# Patient Record
Sex: FEMALE
[De-identification: names, ages, dates, MRNs, and addresses within clinical notes are randomized per-mention and may not be internally consistent; named-entity substitution may affect disease eponyms.]

---

## 2018-05-28 ENCOUNTER — HOSPITAL ENCOUNTER (EMERGENCY)
Dept: HOSPITAL 31 - C.ER | Age: 38
Discharge: HOME | End: 2018-05-28
Payer: MEDICAID

## 2018-05-28 VITALS
DIASTOLIC BLOOD PRESSURE: 78 MMHG | HEART RATE: 69 BPM | TEMPERATURE: 98.2 F | SYSTOLIC BLOOD PRESSURE: 120 MMHG | OXYGEN SATURATION: 100 %

## 2018-05-28 VITALS — RESPIRATION RATE: 18 BRPM

## 2018-05-28 DIAGNOSIS — S39.012A: Primary | ICD-10-CM
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
## 2018-05-28 PROCEDURE — 96372 THER/PROPH/DIAG INJ SC/IM: CPT

## 2018-05-28 PROCEDURE — 99283 EMERGENCY DEPT VISIT LOW MDM: CPT

## 2018-05-28 NOTE — C.PDOC
History Of Present Illness


37 year old female presents to the ED for evaluation of lower back pain which 

began this morning. Patient states she bent over to put her pants on, when she 

felt pain. She applied topical muscle rub cream without relief. She denies neck 

pain, recent trauma/injuries, urinary/bowel incontinence, and extremity numbness

/weakness. 


Time Seen by Provider: 05/28/18 14:25


Chief Complaint (Nursing): Back Pain


History Per: Patient


History/Exam Limitations: no limitations


Onset/Duration Of Symptoms: Hrs


Current Symptoms Are (Timing): Still Present


Quality Of Discomfort: "Pain"


Previous Symptoms: Back Pain


Associated Symptoms: denies: Incontinence, New Weakness, New Numbness


Additional History Per: Patient





Past Medical History


Reviewed: Historical Data, Nursing Documentation, Vital Signs


Vital Signs: 


 Last Vital Signs











Temp  98.2 F   05/28/18 14:16


 


Pulse  69   05/28/18 14:16


 


Resp  18   05/28/18 15:33


 


BP  120/78   05/28/18 14:16


 


Pulse Ox  100   05/28/18 15:30














- Medical History


PMH: No Chronic Diseases


Surgical History: No Surg Hx


Family History: States: Unknown Family Hx





- Social History


Hx Alcohol Use: Yes


Hx Substance Use: No





- Immunization History


Hx Tetanus Toxoid Vaccination: No


Hx Influenza Vaccination: No


Hx Pneumococcal Vaccination: No





Review Of Systems


Gastrointestinal: Negative for: Vomiting, Abdominal Pain, Diarrhea


Genitourinary: Negative for: Incontinence


Musculoskeletal: Positive for: Back Pain (lower).  Negative for: Neck Pain


Neurological: Negative for: Weakness, Numbness





Physical Exam





- Physical Exam


Appears: Non-toxic, No Acute Distress


Skin: Normal Color, Warm, Dry


Head: Atraumatic, Normacephalic


Eye(s): bilateral: Normal Inspection


Oral Mucosa: Moist


Neck: Supple


Chest: Symmetrical, No Deformity, No Tenderness


Cardiovascular: Rhythm Regular, No Murmur


Respiratory: Normal Breath Sounds, No Rales, No Rhonchi, No Wheezing


Gastrointestinal/Abdominal: Soft, No Tenderness, No Distention, No Guarding


Back: Normal Inspection (no rash, no bulging no swelling), No CVA Tenderness, 

No Vertebral Tenderness, Paraspinal Tenderness (lumbar, left greater than right 

)


Extremity: Normal ROM, Capillary Refill (less than 2 seconds )


Neurological/Psych: Oriented x3, Normal Speech, Normal Sensation


Gait: Steady





ED Course And Treatment


O2 Sat by Pulse Oximetry: 100 (on RA)


Pulse Ox Interpretation: Normal





Medical Decision Making


Medical Decision Making: 





Impression: low back strain; based on history and exam, xray is not indicated 





Plan:


* Toradol


* Offer valium or flexeril, she only wants shot for pain since she has not 

eaten today





Progress:


On re-examination, patient is resting comfortably in no acute distress. Patient 

reports improvement of symptoms. Patient feels comfortable going home and will 

be discharged. Patient given follow up instructions. Instructed to return to ER 

if symptoms worsen or new symptoms arise.





Disposition


Counseled Patient/Family Regarding: Diagnosis, Need For Followup, Rx Given





- Disposition


Referrals: 


[facility] [Outside]


[facility] [Outside]


Disposition: HOME/ ROUTINE


Disposition Time: 15:21


Condition: IMPROVED


Additional Instructions: 


Apply heat to area 15 minutes three times a day. Take Motrin as needed for pain 

every 6 hours, with food to not upset stomach. Take Flexeril for muscle pain 

and spasm, caution can cause drowsiness. Follow up with orthopedic if pain 

persists over one week





Aplique calor al presley 15 minutos cricket veces al da. [place] Motrin cuando sea 

necesario para el dolor cada 6 horas, con alimentos para no alterar el est

adán. [place] Flexeril para el dolor y el espasmo muscular, la precaucin puede 

causar somnolencia. Seguimiento con ortopedia si el dolor persiste sarthak katina 

semana


Prescriptions: 


Cyclobenzaprine [Cyclobenzaprine HCl] 10 mg PO TID #21 tab


Ibuprofen [Motrin] 600 mg PO Q8 #30 tab


Instructions:  Lumbar Muscle Strain (DC)


Forms:  [employer] (Kinyarwanda)


Print Language: Brazilian





- POA


Present On Arrival: None





- Clinical Impression


Clinical Impression: 


 Low back strain








- PA / NP / Resident Statement


MD/DO has reviewed & agrees with the documentation as recorded.





- Scribe Statement


The provider has reviewed the documentation as recorded by the Scribe (Dayanara Lopes)








All medical record entries made by the Scribe were at my direction and 

personally dictated by me. I have reviewed the chart and agree that the record 

accurately reflects my personal performance of the history, physical exam, 

medical decision making, and the department course for this patient. I have 

also personally directed, reviewed, and agree with the discharge instructions 

and disposition.